# Patient Record
Sex: FEMALE | Race: ASIAN | NOT HISPANIC OR LATINO | URBAN - METROPOLITAN AREA
[De-identification: names, ages, dates, MRNs, and addresses within clinical notes are randomized per-mention and may not be internally consistent; named-entity substitution may affect disease eponyms.]

---

## 2024-05-05 ENCOUNTER — EMERGENCY (EMERGENCY)
Facility: HOSPITAL | Age: 24
LOS: 1 days | Discharge: HOME | End: 2024-05-05
Attending: STUDENT IN AN ORGANIZED HEALTH CARE EDUCATION/TRAINING PROGRAM | Admitting: STUDENT IN AN ORGANIZED HEALTH CARE EDUCATION/TRAINING PROGRAM
Payer: COMMERCIAL

## 2024-05-05 VITALS
SYSTOLIC BLOOD PRESSURE: 129 MMHG | TEMPERATURE: 98 F | HEART RATE: 84 BPM | DIASTOLIC BLOOD PRESSURE: 79 MMHG | RESPIRATION RATE: 18 BRPM | OXYGEN SATURATION: 99 %

## 2024-05-05 VITALS
TEMPERATURE: 98 F | SYSTOLIC BLOOD PRESSURE: 106 MMHG | HEART RATE: 84 BPM | OXYGEN SATURATION: 99 % | DIASTOLIC BLOOD PRESSURE: 68 MMHG | RESPIRATION RATE: 17 BRPM

## 2024-05-05 PROCEDURE — 73590 X-RAY EXAM OF LOWER LEG: CPT | Mod: 26,RT

## 2024-05-05 PROCEDURE — 28450 TX TARSAL B1 FX W/O MNPJ EA: CPT | Mod: 54,RT

## 2024-05-05 PROCEDURE — 73620 X-RAY EXAM OF FOOT: CPT | Mod: 26,RT

## 2024-05-05 PROCEDURE — 73610 X-RAY EXAM OF ANKLE: CPT | Mod: 26,RT

## 2024-05-05 PROCEDURE — 99284 EMERGENCY DEPT VISIT MOD MDM: CPT | Mod: 57,25

## 2024-05-05 RX ORDER — IBUPROFEN 200 MG
400 TABLET ORAL ONCE
Refills: 0 | Status: COMPLETED | OUTPATIENT
Start: 2024-05-05 | End: 2024-05-05

## 2024-05-05 RX ADMIN — Medication 400 MILLIGRAM(S): at 07:49

## 2024-05-05 RX ADMIN — Medication 400 MILLIGRAM(S): at 07:28

## 2024-05-05 NOTE — ED PROVIDER NOTE - PATIENT PORTAL LINK FT
You can access the FollowMyHealth Patient Portal offered by [facility] by registering at the following website: [email or web address]. By joining [facility]’s FollowMyHealth portal, you will also be able to view your health information using other applications (apps) compatible with our system.

## 2024-05-05 NOTE — ED PROVIDER NOTE - NSFOLLOWUPINSTRUCTIONS_ED_ALL_ED_FT
Discharge Instructions for Navicular Bone Fracture  Diagnosis: You have a fracture of the navicular bone in your foot, which is a small bone located on the top of the midfoot.    Treatment and Recovery:    Non-Weight Bearing: It's crucial to keep weight off your injured foot to allow the fracture to heal properly. You will likely need to use crutches or a knee scooter for a period of time, as instructed by your doctor.    Immobilization: Your foot will be immobilized in a cast or walking boot to keep the bones in place and promote healing. It's important to keep the cast or boot clean and dry.    Wearing the Splint:    Follow Instructions: Wear the splint as instructed by your healthcare provider. This may involve wearing it continuously or only at certain times, such as during activity or at night.  Proper Fit: Ensure the splint fits snugly but not too tightly. It should be comfortable and allow for some movement of the uninjured fingers.  Skin Care: Regularly check the skin under the splint for any signs of irritation, redness, or sores. If you notice any skin problems, contact your healthcare provider.  Care of the Splint:  Cleaning: Keep the splint clean and dry. You may be able to clean it with mild soap and water, but follow your healthcare provider's instructions.  Avoid Getting Wet: If the splint gets wet, dry it thoroughly as soon as possible to prevent skin irritation and damage to the splint.    Pain Management: You may experience pain and swelling in your foot. Over-the-counter pain relievers like ibuprofen 600 mg every 6 hours or acetaminophen 650 mg every 6 hours can help manage discomfort. Elevate your foot as much as possible to reduce swelling.    Follow-up:    Orthopedic Specialist: You will need to follow up with an orthopedic specialist for further evaluation and management of your fracture. They will monitor the healing process and determine when you can start bearing weight on your foot again.    Physical Therapy: Once the fracture has healed sufficiently, you may need physical therapy to regain strength, flexibility, and range of motion in your foot and ankle.  Recovery Time:    Healing Process: Navicular bone fractures can take several weeks to months to heal completely. The exact timeframe will depend on the severity of the fracture and your individual healing process.  Return to Activity:    Gradual Return: It's important to gradually return to your normal activities as instructed by your doctor or physical therapist. Avoid putting excessive stress on your foot too soon to prevent re-injury.  Remember, following these instructions and working closely with your healthcare team will ensure optimal healing and recovery from your navicular bone fracture.

## 2024-05-05 NOTE — ED ADULT NURSE REASSESSMENT NOTE - NS ED NURSE REASSESS COMMENT FT1
pt d/c by provider, pt departed in wheelchair by significant other, vitals stable, no other concerns are noted.

## 2024-05-05 NOTE — ED PROVIDER NOTE - NSFOLLOWUPCLINICS_GEN_ALL_ED_FT
[facility] Orthopedic Surgery  Orthopedic Surgery  [address], 3rd & 4th [place] [address]  Phone: [phone]  Fax:

## 2024-05-05 NOTE — ED ADULT TRIAGE NOTE - CHIEF COMPLAINT QUOTE
C/o trip and fall 1 hour ago. pt rolled R ankle, endorsing pain w ambulation. denies head strike, LOC, blood thinner use. + sensation distal to injury

## 2024-05-05 NOTE — ED PROVIDER NOTE - ATTENDING CONTRIBUTION TO CARE
25yo F with right ankle pain after rolling ankle while going down steps  no other injury  right ankle with lateral mall tenderness  will check xray, sprain vs fx

## 2024-05-05 NOTE — ED PROVIDER NOTE - OBJECTIVE STATEMENT
25 yo F no med hx pw with R ankle/foot pain after falling down stairs and having an inversion injury. No numbness, tingling. C/o pain to lateral foot and lateral ankle.

## 2024-05-05 NOTE — ED ADULT NURSE NOTE - OBJECTIVE STATEMENT
pt is a 24 y.o. female c/o right ankle pain, pt reports going down the stairs and missing one stair and falling, pt denies dizziness, pt denies LOC, pt denies being on blood thinners, pt is ax4, on room air, ambulatory w/ a steady gait, pulses palpable in lower extremity, no other concerns are noted.

## 2024-05-07 ENCOUNTER — APPOINTMENT (OUTPATIENT)
Dept: ORTHOPEDIC SURGERY | Facility: CLINIC | Age: 24
End: 2024-05-07
Payer: COMMERCIAL

## 2024-05-07 VITALS — BODY MASS INDEX: 19.38 KG/M2 | HEIGHT: 67 IN | WEIGHT: 123.46 LBS

## 2024-05-07 DIAGNOSIS — Z78.9 OTHER SPECIFIED HEALTH STATUS: ICD-10-CM

## 2024-05-07 DIAGNOSIS — S93.491A SPRAIN OF OTHER LIGAMENT OF RIGHT ANKLE, INITIAL ENCOUNTER: ICD-10-CM

## 2024-05-07 PROBLEM — Z00.00 ENCOUNTER FOR PREVENTIVE HEALTH EXAMINATION: Status: ACTIVE | Noted: 2024-05-07

## 2024-05-07 PROCEDURE — 99204 OFFICE O/P NEW MOD 45 MIN: CPT

## 2024-05-09 PROBLEM — S93.491A SPRAIN OF ANTERIOR TALOFIBULAR LIGAMENT OF RIGHT ANKLE, INITIAL ENCOUNTER: Status: ACTIVE | Noted: 2024-05-09

## 2024-05-09 NOTE — ASSESSMENT
[FreeTextEntry1] : 24-year-old female with a right navicular fracture and right ankle sprain ATFL - Reviewed images and exam findings and diagnoses with the patient in detail.  Discussed with patient that she does have a navicular fracture and ankle sprain.  Discussed with patient that on the x-ray there is a potential other fracture line that I would like to investigate further with a CT scan.  CT scan is necessary in my opinion to further evaluate the fracture.  If there is greater displacement and appreciated on x-ray patient may benefit from surgical intervention.  Discussed with the patient in detail.  We will order CT scan today for further evaluation evaluation of the intra-articular fracture of the navicular.  After CT scan is completed we will have another discussion with the patient on the findings and proposed treatment plans.  For now patient to continue to be nonweightbearing to the right lower extremity.  Patient was placed in a cam walker boot today.  She will follow-up as soon as the CT scan is completed.  Patient's current prognosis is uncertain pending further imaging modalities.

## 2024-05-09 NOTE — PHYSICAL EXAM
[de-identified] : Right lower extremity - Moderate swelling to the midfoot - Skin intact - Intact dorsiflexion plantarflexion of the ankle and toes - Sensation tact light touch superficial peroneal deep peroneal sural saphenous and tibial nerve distributions - DP pulse palpable - Tenderness over the navicular bone - Tenderness over the ATFL laterally [de-identified] : X-rays from [facility] obtained on 5/5/2024 of the right foot and ankle my independent interpretation: No fractures of the ankle ankle mortise intact there is an avulsion fracture of the navicular there is no other subtle potential fracture line in the midportion of the navicular

## 2024-05-09 NOTE — HISTORY OF PRESENT ILLNESS
[de-identified] : 24-year-old female presents with right foot pain after a fall.  This occurred on 5/5/2024.  Patient was seen at [facility].  X-rays obtained and she was told she had a fracture of her foot was placed in a splint  Today patient states she continues have pain in her right foot.  She has been nonweightbearing since injury.  Denies any numbness or tingling.  States there has been swelling in her right foot.

## 2024-05-13 ENCOUNTER — RESULT REVIEW (OUTPATIENT)
Age: 24
End: 2024-05-13

## 2024-05-13 ENCOUNTER — OUTPATIENT (OUTPATIENT)
Dept: OUTPATIENT SERVICES | Facility: HOSPITAL | Age: 24
LOS: 1 days | End: 2024-05-13
Payer: COMMERCIAL

## 2024-05-13 ENCOUNTER — APPOINTMENT (OUTPATIENT)
Age: 24
End: 2024-05-13
Payer: COMMERCIAL

## 2024-05-13 DIAGNOSIS — S92.251A DISPLACED FRACTURE OF NAVICULAR [SCAPHOID] OF RIGHT FOOT, INITIAL ENCOUNTER FOR CLOSED FRACTURE: ICD-10-CM

## 2024-05-13 PROCEDURE — 76376 3D RENDER W/INTRP POSTPROCES: CPT | Mod: 26

## 2024-05-13 PROCEDURE — 73700 CT LOWER EXTREMITY W/O DYE: CPT | Mod: 26,RT

## 2024-05-13 PROCEDURE — 76376 3D RENDER W/INTRP POSTPROCES: CPT

## 2024-05-13 PROCEDURE — 73700 CT LOWER EXTREMITY W/O DYE: CPT

## 2024-05-17 ENCOUNTER — APPOINTMENT (OUTPATIENT)
Dept: ORTHOPEDIC SURGERY | Facility: CLINIC | Age: 24
End: 2024-05-17
Payer: COMMERCIAL

## 2024-05-17 VITALS — HEIGHT: 67 IN | WEIGHT: 123 LBS | BODY MASS INDEX: 19.3 KG/M2

## 2024-05-17 PROCEDURE — 99213 OFFICE O/P EST LOW 20 MIN: CPT

## 2024-06-11 ENCOUNTER — APPOINTMENT (OUTPATIENT)
Dept: ORTHOPEDIC SURGERY | Facility: CLINIC | Age: 24
End: 2024-06-11
Payer: COMMERCIAL

## 2024-06-11 VITALS — BODY MASS INDEX: 19.3 KG/M2 | HEIGHT: 67 IN | WEIGHT: 123 LBS

## 2024-06-11 DIAGNOSIS — S92.251A DISPLACED FRACTURE OF NAVICULAR [SCAPHOID] OF RIGHT FOOT, INITIAL ENCOUNTER FOR CLOSED FRACTURE: ICD-10-CM

## 2024-06-11 PROCEDURE — 99213 OFFICE O/P EST LOW 20 MIN: CPT | Mod: 25

## 2024-06-11 PROCEDURE — 73630 X-RAY EXAM OF FOOT: CPT | Mod: RT

## 2024-06-13 PROBLEM — S92.251A CLOSED DISPLACED FRACTURE OF NAVICULAR BONE OF RIGHT FOOT, INITIAL ENCOUNTER: Status: ACTIVE | Noted: 2024-05-07

## 2024-06-13 NOTE — PHYSICAL EXAM
[de-identified] : Right lower extremity -Swelling significantly improved - Skin intact - Intact dorsiflexion plantarflexion of the ankle and toes - Sensation tact light touch superficial peroneal deep peroneal sural saphenous and tibial nerve distributions - DP pulse palpable - Tenderness over the navicular bone improved - Tenderness over the ATFL laterally improved [de-identified] : X-ray right foot fractures healing no change in alignment 3 views none

## 2024-06-13 NOTE — HISTORY OF PRESENT ILLNESS
[de-identified] : 24-year-old female presents with right foot pain after a fall.  This occurred on 5/5/2024.  Patient was seen at [facility].  X-rays obtained and she was told she had a fracture of her foot was placed in a splint  Today patient states she continues have pain in her right foot.  She has been nonweightbearing since injury.  Denies any numbness or tingling.  States there has been swelling in her right foot.  5/17/2024: Patient here to review her CT scanning results.  6/11/2024: Pain has been improving.

## 2024-06-13 NOTE — ASSESSMENT
[FreeTextEntry1] : 24-year-old female with a right navicular fracture and right ankle sprain ATFL -Doing well - Continue weight-bear as tolerated can transition out of the cam walker boot - Home exercise program demonstrated - Follow-up in 6 weeks

## 2024-06-13 NOTE — PHYSICAL EXAM
[de-identified] : CT foot right 5/13/2024 IMPRESSION: 1.  Well-corticated osseous fragment along the dorsal aspect of the navicular may represent a nonunified secondary ossification center versus sequelae of old avulsion injury. 2.  Acute avulsion fracture at the attachment of the dorsal calcaneocuboid ligament on the cuboid. 3.  Acute intra-articular fracture along the plantar surface of the cuboid with extension into the 4/5 tarsometatarsal joints.

## 2024-06-13 NOTE — ASSESSMENT
[FreeTextEntry1] : 24-year-old female with a right navicular fracture and right ankle sprain ATFL -CT scan confirmed fracture of the navicular.  Also very minimal nondisplaced fractures of the cuboid as well appreciated.  Discussed patient that she continue to be weight-bear as tolerated in cam walker boot x-rays are.  Patient will likely still need these crutches as she progresses weightbearing.  Follow-up in 4 weeks

## 2024-06-13 NOTE — HISTORY OF PRESENT ILLNESS
[de-identified] : 24-year-old female presents with right foot pain after a fall.  This occurred on 5/5/2024.  Patient was seen at [facility].  X-rays obtained and she was told she had a fracture of her foot was placed in a splint  Today patient states she continues have pain in her right foot.  She has been nonweightbearing since injury.  Denies any numbness or tingling.  States there has been swelling in her right foot.  5/17/2024: Patient here to review her CT scanning results.

## 2024-08-02 ENCOUNTER — APPOINTMENT (OUTPATIENT)
Dept: ORTHOPEDIC SURGERY | Facility: CLINIC | Age: 24
End: 2024-08-02
Payer: COMMERCIAL

## 2024-08-02 VITALS — WEIGHT: 143.3 LBS | HEIGHT: 67 IN | BODY MASS INDEX: 22.49 KG/M2

## 2024-08-02 DIAGNOSIS — S92.251A DISPLACED FRACTURE OF NAVICULAR [SCAPHOID] OF RIGHT FOOT, INITIAL ENCOUNTER FOR CLOSED FRACTURE: ICD-10-CM

## 2024-08-02 PROCEDURE — 99213 OFFICE O/P EST LOW 20 MIN: CPT | Mod: 25

## 2024-08-02 PROCEDURE — 73630 X-RAY EXAM OF FOOT: CPT | Mod: RT

## 2024-08-02 RX ORDER — DICLOFENAC SODIUM 1% 10 MG/G
1 GEL TOPICAL DAILY
Qty: 2 | Refills: 3 | Status: ACTIVE | COMMUNITY
Start: 2024-08-02 | End: 1900-01-01

## 2024-08-03 NOTE — PHYSICAL EXAM
[de-identified] : Right lower extremity - Skin intact - Intact dorsiflexion plantarflexion of the ankle and toes - Sensation tact light touch superficial peroneal deep peroneal sural saphenous and tibial nerve distributions - DP pulse palpable - no sig point tenderness - Tenderness over the ATFL laterally improved [de-identified] : X-ray right foot fractures- fractures appear healed

## 2024-08-03 NOTE — ASSESSMENT
[FreeTextEntry1] : 24-year-old female with a right navicular fracture and right ankle sprain ATFL -Doing well - use normal shoe, WBAT - resume activities as tolerated, will start PT for strengthening, diclofenac gel to foot - f/u 2 months

## 2024-10-08 ENCOUNTER — APPOINTMENT (OUTPATIENT)
Dept: ORTHOPEDIC SURGERY | Facility: CLINIC | Age: 24
End: 2024-10-08

## 2024-10-08 VITALS — HEIGHT: 67 IN | BODY MASS INDEX: 22.44 KG/M2 | WEIGHT: 143 LBS

## 2024-10-08 PROCEDURE — 99213 OFFICE O/P EST LOW 20 MIN: CPT
